# Patient Record
Sex: FEMALE | Race: WHITE | Employment: UNEMPLOYED | URBAN - NONMETROPOLITAN AREA
[De-identification: names, ages, dates, MRNs, and addresses within clinical notes are randomized per-mention and may not be internally consistent; named-entity substitution may affect disease eponyms.]

---

## 2017-12-22 ENCOUNTER — OFFICE VISIT (OUTPATIENT)
Dept: PRIMARY CARE CLINIC | Age: 32
End: 2017-12-22
Payer: COMMERCIAL

## 2017-12-22 VITALS
OXYGEN SATURATION: 98 % | BODY MASS INDEX: 36.94 KG/M2 | SYSTOLIC BLOOD PRESSURE: 126 MMHG | DIASTOLIC BLOOD PRESSURE: 74 MMHG | HEART RATE: 74 BPM | WEIGHT: 258 LBS | HEIGHT: 70 IN | TEMPERATURE: 98.8 F

## 2017-12-22 DIAGNOSIS — H65.02 ACUTE SEROUS OTITIS MEDIA OF LEFT EAR, RECURRENCE NOT SPECIFIED: Primary | ICD-10-CM

## 2017-12-22 DIAGNOSIS — J02.9 SORE THROAT: ICD-10-CM

## 2017-12-22 DIAGNOSIS — J01.00 ACUTE MAXILLARY SINUSITIS, RECURRENCE NOT SPECIFIED: ICD-10-CM

## 2017-12-22 LAB — S PYO AG THROAT QL: NORMAL

## 2017-12-22 PROCEDURE — 99213 OFFICE O/P EST LOW 20 MIN: CPT | Performed by: FAMILY MEDICINE

## 2017-12-22 PROCEDURE — 87880 STREP A ASSAY W/OPTIC: CPT | Performed by: FAMILY MEDICINE

## 2017-12-22 RX ORDER — AZITHROMYCIN 250 MG/1
TABLET, FILM COATED ORAL
Qty: 1 PACKET | Refills: 0 | Status: SHIPPED | OUTPATIENT
Start: 2017-12-22 | End: 2017-12-26

## 2017-12-23 NOTE — PROGRESS NOTES
lymphadenopathy. Chest is clear to auscultation, no wheezes, rales, or rhonchi. Heart sounds are regular rate and rhythm, no murmurs. Rapid Strep was negative. Assessment & Plan:    1. Acute serous otitis media of left ear, recurrence not specified  2. Acute maxillary sinusitis, recurrence not specified  - azithromycin (ZITHROMAX) 250 MG tablet; Take 2 po Day 1, then 1 po Daily Days 2-5. Take with food. Dispense: 1 packet; Refill: 0    She was advised to follow up if symptoms worsen or do not resolve.          (Please note that portions of this note were completed with a voice-recognition program. Efforts were made to edit the dictation but occasionally words are mis-transcribed.)

## 2018-10-25 ENCOUNTER — OFFICE VISIT (OUTPATIENT)
Dept: PRIMARY CARE CLINIC | Age: 33
End: 2018-10-25
Payer: COMMERCIAL

## 2018-10-25 VITALS
DIASTOLIC BLOOD PRESSURE: 60 MMHG | SYSTOLIC BLOOD PRESSURE: 110 MMHG | HEIGHT: 70 IN | BODY MASS INDEX: 39.22 KG/M2 | OXYGEN SATURATION: 97 % | WEIGHT: 274 LBS | TEMPERATURE: 97.4 F | HEART RATE: 80 BPM

## 2018-10-25 DIAGNOSIS — M79.10 MYALGIA: ICD-10-CM

## 2018-10-25 DIAGNOSIS — R05.9 COUGH: Primary | ICD-10-CM

## 2018-10-25 DIAGNOSIS — J01.41 ACUTE RECURRENT PANSINUSITIS: ICD-10-CM

## 2018-10-25 LAB
INFLUENZA A ANTIBODY: NEGATIVE
INFLUENZA B ANTIBODY: NEGATIVE

## 2018-10-25 PROCEDURE — 87804 INFLUENZA ASSAY W/OPTIC: CPT | Performed by: PHYSICIAN ASSISTANT

## 2018-10-25 PROCEDURE — 99213 OFFICE O/P EST LOW 20 MIN: CPT | Performed by: PHYSICIAN ASSISTANT

## 2018-10-25 RX ORDER — BLOOD-GLUCOSE METER
EACH MISCELLANEOUS
Refills: 0 | COMMUNITY
Start: 2018-10-22 | End: 2020-06-09 | Stop reason: ALTCHOICE

## 2018-10-25 RX ORDER — BLOOD SUGAR DIAGNOSTIC
STRIP MISCELLANEOUS
Refills: 0 | COMMUNITY
Start: 2018-10-22 | End: 2020-06-09 | Stop reason: ALTCHOICE

## 2018-10-25 RX ORDER — FLUTICASONE PROPIONATE 50 MCG
1 SPRAY, SUSPENSION (ML) NASAL
COMMUNITY
Start: 2017-12-22 | End: 2020-06-09

## 2018-10-25 RX ORDER — AZITHROMYCIN 250 MG/1
TABLET, FILM COATED ORAL
Qty: 1 PACKET | Refills: 1 | Status: SHIPPED | OUTPATIENT
Start: 2018-10-25 | End: 2018-10-29

## 2018-10-25 RX ORDER — PROMETHAZINE HYDROCHLORIDE 25 MG/1
1 TABLET ORAL PRN
Refills: 0 | COMMUNITY
Start: 2018-10-09 | End: 2020-06-09 | Stop reason: ALTCHOICE

## 2018-10-25 ASSESSMENT — ENCOUNTER SYMPTOMS
COUGH: 1
SINUS PRESSURE: 1
SORE THROAT: 0
TROUBLE SWALLOWING: 0
RHINORRHEA: 1
VOMITING: 0
SINUS PAIN: 1
SHORTNESS OF BREATH: 1
NAUSEA: 0
CHEST TIGHTNESS: 0
WHEEZING: 1
DIARRHEA: 0

## 2018-10-25 ASSESSMENT — PATIENT HEALTH QUESTIONNAIRE - PHQ9
1. LITTLE INTEREST OR PLEASURE IN DOING THINGS: 0
2. FEELING DOWN, DEPRESSED OR HOPELESS: 0
SUM OF ALL RESPONSES TO PHQ QUESTIONS 1-9: 0
SUM OF ALL RESPONSES TO PHQ QUESTIONS 1-9: 0
SUM OF ALL RESPONSES TO PHQ9 QUESTIONS 1 & 2: 0

## 2018-11-03 ENCOUNTER — TELEPHONE (OUTPATIENT)
Dept: PRIMARY CARE CLINIC | Age: 33
End: 2018-11-03

## 2018-11-03 DIAGNOSIS — J01.41 ACUTE RECURRENT PANSINUSITIS: Primary | ICD-10-CM

## 2018-11-04 RX ORDER — CEFPROZIL 500 MG/1
500 TABLET, FILM COATED ORAL 2 TIMES DAILY
Qty: 20 TABLET | Refills: 0 | Status: SHIPPED | OUTPATIENT
Start: 2018-11-04 | End: 2018-11-14

## 2019-05-24 ENCOUNTER — HOSPITAL ENCOUNTER (EMERGENCY)
Age: 34
Discharge: HOME | End: 2019-05-24
Attending: EMERGENCY MEDICINE
Payer: COMMERCIAL

## 2019-05-24 ENCOUNTER — APPOINTMENT (OUTPATIENT)
Dept: CT IMAGING | Age: 34
End: 2019-05-24
Payer: COMMERCIAL

## 2019-05-24 VITALS
SYSTOLIC BLOOD PRESSURE: 148 MMHG | WEIGHT: 235 LBS | TEMPERATURE: 97.7 F | DIASTOLIC BLOOD PRESSURE: 71 MMHG | OXYGEN SATURATION: 99 % | RESPIRATION RATE: 16 BRPM | BODY MASS INDEX: 33.72 KG/M2 | HEART RATE: 86 BPM

## 2019-05-24 DIAGNOSIS — R51.9 ACUTE NONINTRACTABLE HEADACHE, UNSPECIFIED HEADACHE TYPE: Primary | ICD-10-CM

## 2019-05-24 DIAGNOSIS — G43.109 MIGRAINE WITH AURA AND WITHOUT STATUS MIGRAINOSUS, NOT INTRACTABLE: ICD-10-CM

## 2019-05-24 LAB
ANION GAP SERPL CALCULATED.3IONS-SCNC: 12 MEQ/L (ref 8–16)
BASOPHILS # BLD: 0.5 %
BASOPHILS ABSOLUTE: 0 THOU/MM3 (ref 0–0.1)
BUN BLDV-MCNC: 12 MG/DL (ref 7–22)
CALCIUM SERPL-MCNC: 9.6 MG/DL (ref 8.5–10.5)
CHLORIDE BLD-SCNC: 102 MEQ/L (ref 98–111)
CO2: 28 MEQ/L (ref 23–33)
CREAT SERPL-MCNC: 0.7 MG/DL (ref 0.4–1.2)
EOSINOPHIL # BLD: 2 %
EOSINOPHILS ABSOLUTE: 0.2 THOU/MM3 (ref 0–0.4)
ERYTHROCYTE [DISTWIDTH] IN BLOOD BY AUTOMATED COUNT: 13.2 % (ref 11.5–14.5)
ERYTHROCYTE [DISTWIDTH] IN BLOOD BY AUTOMATED COUNT: 40.6 FL (ref 35–45)
GFR SERPL CREATININE-BSD FRML MDRD: > 90 ML/MIN/1.73M2
GLUCOSE BLD-MCNC: 100 MG/DL (ref 70–108)
HCT VFR BLD CALC: 39 % (ref 37–47)
HEMOGLOBIN: 12.2 GM/DL (ref 12–16)
IMMATURE GRANS (ABS): 0.02 THOU/MM3 (ref 0–0.07)
IMMATURE GRANULOCYTES: 0.2 %
LYMPHOCYTES # BLD: 28.7 %
LYMPHOCYTES ABSOLUTE: 2.5 THOU/MM3 (ref 1–4.8)
MCH RBC QN AUTO: 26.6 PG (ref 26–33)
MCHC RBC AUTO-ENTMCNC: 31.3 GM/DL (ref 32.2–35.5)
MCV RBC AUTO: 85 FL (ref 81–99)
MONOCYTES # BLD: 4.9 %
MONOCYTES ABSOLUTE: 0.4 THOU/MM3 (ref 0.4–1.3)
NUCLEATED RED BLOOD CELLS: 0 /100 WBC
OSMOLALITY CALCULATION: 283 MOSMOL/KG (ref 275–300)
PLATELET # BLD: 264 THOU/MM3 (ref 130–400)
PMV BLD AUTO: 11.4 FL (ref 9.4–12.4)
POTASSIUM SERPL-SCNC: 3.9 MEQ/L (ref 3.5–5.2)
PREGNANCY, SERUM: NEGATIVE
RBC # BLD: 4.59 MILL/MM3 (ref 4.2–5.4)
SEG NEUTROPHILS: 63.7 %
SEGMENTED NEUTROPHILS ABSOLUTE COUNT: 5.6 THOU/MM3 (ref 1.8–7.7)
SODIUM BLD-SCNC: 142 MEQ/L (ref 135–145)
WBC # BLD: 8.8 THOU/MM3 (ref 4.8–10.8)

## 2019-05-24 PROCEDURE — 36415 COLL VENOUS BLD VENIPUNCTURE: CPT

## 2019-05-24 PROCEDURE — 96375 TX/PRO/DX INJ NEW DRUG ADDON: CPT

## 2019-05-24 PROCEDURE — 6360000002 HC RX W HCPCS: Performed by: EMERGENCY MEDICINE

## 2019-05-24 PROCEDURE — 70450 CT HEAD/BRAIN W/O DYE: CPT

## 2019-05-24 PROCEDURE — 84703 CHORIONIC GONADOTROPIN ASSAY: CPT

## 2019-05-24 PROCEDURE — 85025 COMPLETE CBC W/AUTO DIFF WBC: CPT

## 2019-05-24 PROCEDURE — 96374 THER/PROPH/DIAG INJ IV PUSH: CPT

## 2019-05-24 PROCEDURE — 80048 BASIC METABOLIC PNL TOTAL CA: CPT

## 2019-05-24 PROCEDURE — 99284 EMERGENCY DEPT VISIT MOD MDM: CPT

## 2019-05-24 PROCEDURE — 2580000003 HC RX 258: Performed by: EMERGENCY MEDICINE

## 2019-05-24 RX ORDER — SUMATRIPTAN 6 MG/.5ML
6 INJECTION, SOLUTION SUBCUTANEOUS ONCE
Status: COMPLETED | OUTPATIENT
Start: 2019-05-24 | End: 2019-05-24

## 2019-05-24 RX ORDER — SODIUM CHLORIDE 9 MG/ML
INJECTION, SOLUTION INTRAVENOUS CONTINUOUS
Status: DISCONTINUED | OUTPATIENT
Start: 2019-05-24 | End: 2019-05-25 | Stop reason: HOSPADM

## 2019-05-24 RX ORDER — KETOROLAC TROMETHAMINE 30 MG/ML
30 INJECTION, SOLUTION INTRAMUSCULAR; INTRAVENOUS ONCE
Status: COMPLETED | OUTPATIENT
Start: 2019-05-24 | End: 2019-05-24

## 2019-05-24 RX ORDER — SUMATRIPTAN 50 MG/1
50 TABLET, FILM COATED ORAL
Qty: 4 TABLET | Refills: 0 | Status: SHIPPED | OUTPATIENT
Start: 2019-05-24 | End: 2020-06-09 | Stop reason: ALTCHOICE

## 2019-05-24 RX ORDER — 0.9 % SODIUM CHLORIDE 0.9 %
500 INTRAVENOUS SOLUTION INTRAVENOUS ONCE
Status: COMPLETED | OUTPATIENT
Start: 2019-05-24 | End: 2019-05-24

## 2019-05-24 RX ORDER — ONDANSETRON 2 MG/ML
4 INJECTION INTRAMUSCULAR; INTRAVENOUS ONCE
Status: COMPLETED | OUTPATIENT
Start: 2019-05-24 | End: 2019-05-24

## 2019-05-24 RX ORDER — ONDANSETRON 4 MG/1
4 TABLET, ORALLY DISINTEGRATING ORAL 3 TIMES DAILY PRN
Qty: 8 TABLET | Refills: 0 | Status: SHIPPED | OUTPATIENT
Start: 2019-05-24 | End: 2020-06-09 | Stop reason: ALTCHOICE

## 2019-05-24 RX ADMIN — SUMATRIPTAN 6 MG: 6 INJECTION, SOLUTION SUBCUTANEOUS at 20:44

## 2019-05-24 RX ADMIN — KETOROLAC TROMETHAMINE 30 MG: 30 INJECTION, SOLUTION INTRAMUSCULAR at 21:28

## 2019-05-24 RX ADMIN — ONDANSETRON 4 MG: 2 INJECTION INTRAMUSCULAR; INTRAVENOUS at 21:24

## 2019-05-24 RX ADMIN — SODIUM CHLORIDE 500 ML: 9 INJECTION, SOLUTION INTRAVENOUS at 21:24

## 2019-05-24 ASSESSMENT — PAIN DESCRIPTION - PAIN TYPE: TYPE: ACUTE PAIN

## 2019-05-24 ASSESSMENT — ENCOUNTER SYMPTOMS
ABDOMINAL PAIN: 0
BACK PAIN: 0
VOMITING: 0
COUGH: 0
NAUSEA: 1
SORE THROAT: 0
EYE DISCHARGE: 0
RHINORRHEA: 0
SHORTNESS OF BREATH: 0
DIARRHEA: 0
PHOTOPHOBIA: 1
WHEEZING: 0
EYE PAIN: 0

## 2019-05-24 ASSESSMENT — PAIN DESCRIPTION - LOCATION: LOCATION: HEAD

## 2019-05-24 ASSESSMENT — PAIN SCALES - GENERAL: PAINLEVEL_OUTOF10: 7

## 2019-05-25 NOTE — ED TRIAGE NOTES
Patient presents to the ED ambulatory with complaints of a headache that started an hour ago. Patient unsure of pregnancy. Patient stating that she has some vision changes and that the pain is mostly over her right eye. No LOC. Patient is nauseated but denies any vomiting.

## 2019-05-25 NOTE — ED PROVIDER NOTES
[address] [address]   eMERGENCY dEPARTMENT eNCOUnter        CHIEF COMPLAINT    Chief Complaint   Patient presents with    Headache       Nurses Notes reviewed and I agree except as noted in the HPI. HPI    Jeff Ribeiro is a 30 y.o. female who presents for evaluation of a right frontal headache that started 1 hour ago. She describes it as sharp and currently rates her pain as a 9/10 in severity. Patient is also complaining of nausea, photophobia, left hand numbness, vision changes saying that there is a \"black dot\", and that her vision is blurred. She has taken tylenol with no relief. Patient says that when she was in highschool she had chronic migraines and when she was pregnant she did as well. She is currently 5 months post partum. Patient denies further questions or complaints at this time. REVIEW OF SYSTEMS    Review of Systems   Constitutional: Negative for appetite change, chills, fatigue and fever. HENT: Negative for congestion, ear pain, rhinorrhea and sore throat. Eyes: Positive for photophobia and visual disturbance (\"black dot\", blur). Negative for pain and discharge. Respiratory: Negative for cough, shortness of breath and wheezing. Cardiovascular: Negative for chest pain, palpitations and leg swelling. Gastrointestinal: Positive for nausea. Negative for abdominal pain, diarrhea and vomiting. Genitourinary: Negative for difficulty urinating, dysuria, hematuria and vaginal discharge. Musculoskeletal: Negative for arthralgias, back pain, joint swelling and neck pain. Skin: Negative for pallor and rash. Neurological: Positive for numbness (left hand) and headaches (right frontal). Negative for dizziness, syncope, weakness and light-headedness. Hematological: Negative for adenopathy. Psychiatric/Behavioral: Negative for confusion and suicidal ideas. The patient is not nervous/anxious.         PAST MEDICAL HISTORY     has a past medical history of Asthma, GERD (gastroesophageal reflux disease), and Molar pregnancy. SURGICAL HISTORY     has a past surgical history that includes Cholecystectomy; [place] tooth extraction; Tonsillectomy; and Adenoidectomy. CURRENT MEDICATIONS    Discharge Medication List as of 5/24/2019 10:00 PM      CONTINUE these medications which have NOT CHANGED    Details   promethazine (PHENERGAN) 25 MG tablet 1 tablet as needed, R-0Historical Med      ONETOUCH VERIO strip TEST four times a day, R-0, DAWHistorical Med      Blood Glucose Monitoring Suppl (ONETOUCH VERIO) w/Device KIT use four times a day as directed, R-0Historical Med      DOCOSAHEXAENOIC ACID PO Take 1 capsule by mouthHistorical Med      fluticasone (FLONASE) 50 MCG/ACT nasal spray 1 spray by Nasal routeHistorical Med      albuterol sulfate  (90 BASE) MCG/ACT inhaler Inhale 2 puffs into the lungs every 6 hours as needed for Wheezing or Shortness of Breath, Disp-1 Inhaler, R-0             ALLERGIES    is allergic to sulfa antibiotics and pcn [penicillins]. FAMILY HISTORY    indicated that the status of her mother is unknown. She indicated that the status of her father is unknown. She indicated that the status of her sister is unknown. She indicated that the status of her maternal grandfather is unknown. She indicated that the status of her paternal grandmother is unknown. She indicated that the status of her paternal grandfather is unknown.   family history includes Asthma in her sister; Cancer in her paternal grandfather; Diabetes in her paternal grandmother; Heart Disease in her maternal grandfather and mother; High Cholesterol in her father. SOCIAL HISTORY     reports that she has never smoked. She has never used smokeless tobacco. She reports that she drinks alcohol. She reports that she does not use drugs.     PHYSICAL EXAM      INITIAL VITALS: BP (!) 148/71   Pulse 86   Temp 97.7 °F (36.5 °C) (Oral)   Resp 16   Wt 235 lb (106.6 and MRI have been read by the radiologist.  [address] [place] [address]   Final Result   1. Negative exam for acute appearing pathology. Stable appearing calcifications compared to prior study. **This report has been created using voice recognition software. It may contain minor errors which are inherent in voice recognition technology. **      Final report electronically signed by Dr. Michelle Lorenzo on 5/24/2019 9:55 PM            Vitals:    Vitals:    05/24/19 2019   BP: (!) 148/71   Pulse: 86   Resp: 16   Temp: 97.7 °F (36.5 °C)   TempSrc: Oral   SpO2: 99%   Weight: 235 lb (106.6 kg)       EMERGENCY DEPARTMENT COURSE:    Medications   SUMAtriptan (IMITREX) injection 6 mg (6 mg Subcutaneous Given 5/24/19 2044)   0.9 % sodium chloride bolus (0 mLs Intravenous Stopped 5/24/19 2204)   ondansetron (ZOFRAN) injection 4 mg (4 mg Intravenous Given 5/24/19 2124)   ketorolac (TORADOL) injection 30 mg (30 mg Intravenous Given 5/24/19 2128)       The pt was seen and evaluated by me. Within the department, I observed the pt's vitalsigns to be within acceptable range. Laboratory and Radiological studies were performed, results were reviewed with the patient. Within the department, the pt was treated with Imitrex, without help then IV fluid hydration, Zofran, Toradol. I observed the pt's condition to be hemodynamically stable and the patient's migraine improve during the duration of their stay. I explained my proposed course of treatment to the pt, and they were amenable to my decision. They were discharged home, and they will return to the ED if their symptoms become more severein nature, or otherwise change. CRITICAL CARE:   None. CONSULTS:  None    PROCEDURES:  None. FINAL IMPRESSION       1. Acute nonintractable headache, unspecified headache type    2.  Migraine with aura and without status migrainosus, not intractable          DISPOSITION/PLAN  PATIENT REFERRED TO:  Kathleen Ayala MD  [address] [place] [address] [place]  [phone]    Schedule an appointment as soon as possible for a visit in 4 days      [facility] [address] [address] EMERGENCY DEPT  [address] [address]  [phone]    As needed, If symptoms worsen    DISCHARGEMEDICATIONS:  Discharge Medication List as of 5/24/2019 10:00 PM      START taking these medications    Details   SUMAtriptan (IMITREX) 50 MG tablet Take 1 tablet by mouth once as needed for Migraine, Disp-4 tablet, R-0Print      ondansetron (ZOFRAN-ODT) 4 MG disintegrating tablet Take 1 tablet by mouth 3 times daily as needed for Nausea or Vomiting, Disp-8 tablet, R-0Print             (Please note that portions of this note were completed with a voice recognition program.  Efforts were [facility] edit the dictations but occasionally words are mis-transcribed.)      Scribe:  Lawrence Hoffmann  06/03/19 8:33 PM Scribing for and in the presence of Onelia Roth M.D. Signed by: Lawrence Hoffmann (Name), Arvind, 06/03/19 11:52 AM    Provider:  I personally performed the services described in the documentation, reviewed and edited the documentation which was dictated to the scribe in mypresence, and it accurately records my words and actions.      06/03/19 11:52 AM      Estefany Fuller MD      Emergency room physician             Estefany Fuller MD  06/03/19 7182

## 2020-06-09 ENCOUNTER — OUTSIDE SERVICES (OUTPATIENT)
Dept: INTERNAL MEDICINE | Age: 35
End: 2020-06-09
Payer: COMMERCIAL

## 2020-06-09 RX ORDER — FLUTICASONE PROPIONATE 50 MCG
1 SPRAY, SUSPENSION (ML) NASAL DAILY
COMMUNITY

## 2020-06-09 RX ORDER — PROPRANOLOL HCL 60 MG
60 CAPSULE, EXTENDED RELEASE 24HR ORAL DAILY
COMMUNITY

## 2020-06-09 RX ORDER — ALBUTEROL SULFATE 90 UG/1
2 AEROSOL, METERED RESPIRATORY (INHALATION) EVERY 6 HOURS PRN
COMMUNITY

## 2020-06-09 RX ORDER — BUTALBITAL, ACETAMINOPHEN AND CAFFEINE 50; 325; 40 MG/1; MG/1; MG/1
1 TABLET ORAL EVERY 6 HOURS PRN
Qty: 180 TABLET | Refills: 3 | Status: CANCELLED
Start: 2020-06-09

## 2020-06-09 RX ORDER — PROPRANOLOL HCL 60 MG
60 CAPSULE, EXTENDED RELEASE 24HR ORAL DAILY
Qty: 60 CAPSULE | Refills: 0 | Status: CANCELLED | OUTPATIENT
Start: 2020-06-09

## 2020-06-09 RX ORDER — BUTALBITAL, ACETAMINOPHEN AND CAFFEINE 50; 325; 40 MG/1; MG/1; MG/1
1 TABLET ORAL EVERY 6 HOURS PRN
COMMUNITY

## 2020-06-09 RX ORDER — TRIAMCINOLONE ACETONIDE 0.25 MG/G
OINTMENT TOPICAL 2 TIMES DAILY
Qty: 15 G | Refills: 0 | Status: SHIPPED | OUTPATIENT
Start: 2020-06-09 | End: 2020-06-11 | Stop reason: ALTCHOICE

## 2020-06-09 RX ORDER — TRIAMCINOLONE ACETONIDE 1 MG/ML
LOTION TOPICAL 2 TIMES DAILY
Qty: 60 ML | Status: CANCELLED | OUTPATIENT
Start: 2020-06-09

## 2020-06-09 ASSESSMENT — ENCOUNTER SYMPTOMS
DIARRHEA: 0
SHORTNESS OF BREATH: 0
RHINORRHEA: 0
NAUSEA: 0
COUGH: 0
WHEEZING: 0
VOMITING: 0

## 2020-06-09 NOTE — PROGRESS NOTES
Negative for congestion and rhinorrhea. Respiratory: Negative for cough, shortness of breath and wheezing. Gastrointestinal: Negative for diarrhea, nausea and vomiting. Skin: Positive for rash. Neurological: Negative for dizziness and weakness. Objective: There were no vitals filed for this visit. Physical Exam  Constitutional:       General: She is not in acute distress. HENT:      Head: Normocephalic and atraumatic. Right Ear: External ear normal.      Left Ear: External ear normal.   Eyes:      Extraocular Movements: Extraocular movements intact. Conjunctiva/sclera: Conjunctivae normal.   Pulmonary:      Effort: Pulmonary effort is normal. No respiratory distress. Skin:     General: Skin is warm and dry. Neurological:      General: No focal deficit present. Mental Status: She is alert and oriented to person, place, and time. Psychiatric:         Mood and Affect: Mood normal.         Behavior: Behavior normal.         Assessment/Plan:        1. Dermatitis  - Will start triamcinolone. Supportive care discussed        Return if symptoms worsen or fail to improve. No orders of the defined types were placed in this encounter. Orders Placed This Encounter   Medications    triamcinolone (KENALOG) 0.025 % ointment     Sig: Apply topically 2 times daily To affected area for up to 2 weeks     Dispense:  15 g     Refill:  0       All patient questions answered. Pt voiced understanding.              Electronically signed by SUDEEP Alston on 6/9/2020 at 2:31 PM

## 2020-06-11 ENCOUNTER — TELEPHONE (OUTPATIENT)
Dept: INTERNAL MEDICINE | Age: 35
End: 2020-06-11

## 2020-06-11 RX ORDER — PREDNISONE 20 MG/1
20 TABLET ORAL DAILY
Qty: 3 TABLET | Refills: 0 | Status: SHIPPED | OUTPATIENT
Start: 2020-06-11 | End: 2020-06-14

## 2021-04-27 ENCOUNTER — OUTSIDE SERVICES (OUTPATIENT)
Dept: INTERNAL MEDICINE | Age: 36
End: 2021-04-27
Payer: COMMERCIAL

## 2021-04-27 VITALS — SYSTOLIC BLOOD PRESSURE: 112 MMHG | DIASTOLIC BLOOD PRESSURE: 68 MMHG | RESPIRATION RATE: 16 BRPM

## 2021-04-27 DIAGNOSIS — J06.9 VIRAL URI WITH COUGH: Primary | ICD-10-CM

## 2021-04-27 ASSESSMENT — ENCOUNTER SYMPTOMS
SHORTNESS OF BREATH: 0
NAUSEA: 0
DIARRHEA: 0
COUGH: 1
VOMITING: 0
SORE THROAT: 1

## 2021-04-27 NOTE — PROGRESS NOTES
Colby [address] is a 31 y.o. female employee of [employer] who presents today for medical conditions/complaints as noted below. HPI:     HPI    Patient presents for evaluation and management of cough. Symptoms started over the weekend, initially had some pharyngitis, later developed cough and congestion. Afebrile. She works at [employer] and rapid COVID swab was negative this morning. Denies dyspnea. She does have a history of asthma, and notes these symptoms have been worsening recently. Does occasionally have some wheezing, which is relieved by albuterol. Has been using this more frequently over the past few days. Has also tried mucinex with minimal improvement, occasionally has also had some sudafed for cough. Denies nausea, vomiting, diarrhea, fever, or chills. Current Outpatient Medications   Medication Sig Dispense Refill    propranolol (INDERAL LA) 60 MG extended release capsule Take 60 mg by mouth daily      butalbital-acetaminophen-caffeine (FIORICET, ESGIC) -40 MG per tablet Take 1 tablet by mouth every 6 hours as needed for Headaches      albuterol sulfate HFA (VENTOLIN HFA) 108 (90 Base) MCG/ACT inhaler Inhale 2 puffs into the lungs every 6 hours as needed for Wheezing or Shortness of Breath      fluticasone (FLONASE) 50 MCG/ACT nasal spray 1 spray by Each Nostril route daily       No current facility-administered medications for this visit.       Allergies   Allergen Reactions    Sulfa Antibiotics Hives    Pcn [Penicillins] Rash     Some difficult breathing       Health Maintenance   Topic Date Due    Hepatitis C screen  Never done    Varicella vaccine (1 of 2 - 2-dose childhood series) Never done    Pneumococcal 0-64 years Vaccine (1 of 1 - PPSV23) Never done    HIV screen  Never done    COVID-19 Vaccine (1) Never done    DTaP/Tdap/Td vaccine (1 - Tdap) Never done    Cervical cancer screen  Never done    Flu vaccine (Season Ended) 09/01/2021    Hepatitis A vaccine  Aged Out    Hepatitis B vaccine  Aged Out    Hib vaccine  Aged Out    Meningococcal (ACWY) vaccine  Aged Out       Subjective:      Review of Systems   Constitutional: Negative for chills and fever. HENT: Positive for congestion and sore throat. Respiratory: Positive for cough. Negative for shortness of breath. Cardiovascular: Negative for chest pain. Gastrointestinal: Negative for diarrhea, nausea and vomiting. Musculoskeletal: Negative for myalgias. Objective:     Vitals:    04/27/21 0907   BP: 112/68   Resp: 16     Physical Exam  Constitutional:       General: She is not in acute distress. HENT:      Head: Normocephalic and atraumatic. Mouth/Throat:      Pharynx: No oropharyngeal exudate. Comments: Mild erythema  Eyes:      Extraocular Movements: Extraocular movements intact. Conjunctiva/sclera: Conjunctivae normal.   Cardiovascular:      Rate and Rhythm: Normal rate and regular rhythm. Pulmonary:      Effort: Pulmonary effort is normal. No respiratory distress. Breath sounds: Normal breath sounds. Lymphadenopathy:      Cervical: No cervical adenopathy. Skin:     General: Skin is warm and dry. Neurological:      Mental Status: She is alert. Assessment/Plan:        1. Viral URI with cough  - Discussed supportive care at this time. Advised to use albuterol regularly Q6H for several days. No signs of acute asthma exacerbation at this time, but did discuss treatment with prednisone if wheezing does not improve. Return if symptoms worsen or fail to improve. No orders of the defined types were placed in this encounter. No orders of the defined types were placed in this encounter.                 Electronically signed by SUDEEP Canales on 4/27/2021 at 10:59 AM

## 2021-11-16 ENCOUNTER — OUTSIDE SERVICES (OUTPATIENT)
Dept: INTERNAL MEDICINE | Age: 36
End: 2021-11-16
Payer: COMMERCIAL

## 2021-11-16 DIAGNOSIS — J30.2 SEASONAL ALLERGIES: ICD-10-CM

## 2021-11-16 DIAGNOSIS — J32.9 RECURRENT SINUSITIS: Primary | ICD-10-CM

## 2021-11-16 RX ORDER — AZITHROMYCIN 250 MG/1
TABLET, FILM COATED ORAL
Qty: 6 TABLET | Refills: 0 | Status: SHIPPED | OUTPATIENT
Start: 2021-11-16

## 2021-11-16 ASSESSMENT — ENCOUNTER SYMPTOMS
SHORTNESS OF BREATH: 0
SINUS PRESSURE: 1
SINUS PAIN: 1
VOMITING: 0
DIARRHEA: 0
NAUSEA: 0
COUGH: 0
WHEEZING: 0

## 2021-11-16 NOTE — PROGRESS NOTES
[facility] Internal Medicine  [address] [place], [address] [place]  [phone]        HPI:     HPI     Patient presents for evaluation and management of sinus pressure. She notes she has had maxillary sinus pressure for over 1. Has also noted some lymphadenopathy. Afebrile, she does note she has had some chills. She has been negative Covid test.  Does have a history of asthma and seasonal allergies. She has tried Sudafed, Zyrtec, Allegra-D, and Flonase to treat, with minimal improvement. Current Outpatient Medications   Medication Sig Dispense Refill    azithromycin (ZITHROMAX) 250 MG tablet 500 mg on day 1 followed by 250 mg on days 2 - 5 6 tablet 0    propranolol (INDERAL LA) 60 MG extended release capsule Take 60 mg by mouth daily      butalbital-acetaminophen-caffeine (FIORICET, ESGIC) -40 MG per tablet Take 1 tablet by mouth every 6 hours as needed for Headaches      albuterol sulfate HFA (VENTOLIN HFA) 108 (90 Base) MCG/ACT inhaler Inhale 2 puffs into the lungs every 6 hours as needed for Wheezing or Shortness of Breath      fluticasone (FLONASE) 50 MCG/ACT nasal spray 1 spray by Each Nostril route daily       No current facility-administered medications for this visit.      Allergies   Allergen Reactions    Sulfa Antibiotics Hives    Pcn [Penicillins] Rash     Some difficult breathing       Health Maintenance   Topic Date Due    Hepatitis C screen  Never done    Varicella vaccine (1 of 2 - 2-dose childhood series) Never done    Pneumococcal 0-64 years Vaccine (1 of 2 - PPSV23) Never done    COVID-19 Vaccine (1) Never done    HIV screen  Never done    DTaP/Tdap/Td vaccine (1 - Tdap) Never done    Cervical cancer screen  Never done    Flu vaccine (1) Never done    Hepatitis A vaccine  Aged Out    Hepatitis B vaccine  Aged Out    Hib vaccine  Aged Out    Meningococcal (ACWY) vaccine  Aged Out       Subjective:      Review of Systems   Constitutional: Negative for chills and fever. HENT: Positive for congestion, sinus pressure and sinus pain. Respiratory: Negative for cough, shortness of breath and wheezing. Cardiovascular: Negative for chest pain and leg swelling. Gastrointestinal: Negative for diarrhea, nausea and vomiting. Neurological: Negative for dizziness and weakness. Objective: There were no vitals filed for this visit. Physical Exam  Constitutional:       General: She is not in acute distress. HENT:      Head: Normocephalic and atraumatic. Right Ear: External ear normal.      Left Ear: External ear normal.      Nose:      Right Sinus: Maxillary sinus tenderness present. Left Sinus: Maxillary sinus tenderness present. Eyes:      Extraocular Movements: Extraocular movements intact. Conjunctiva/sclera: Conjunctivae normal.   Cardiovascular:      Rate and Rhythm: Normal rate and regular rhythm. Pulmonary:      Effort: Pulmonary effort is normal. No respiratory distress. Breath sounds: Normal breath sounds. Neurological:      General: No focal deficit present. Mental Status: She is alert. Mental status is at baseline. Psychiatric:         Mood and Affect: Mood normal.         Behavior: Behavior normal.         Assessment/Plan:        1. Recurrent sinusitis  2. Seasonal allergies  - Start azithromycin. Advised she may continue allegra D and flonase. Will refer to ENT given she has had recurrent sinusitis. - azithromycin (ZITHROMAX) 250 MG tablet; 500 mg on day 1 followed by 250 mg on days 2 - 5  Dispense: 6 tablet; Refill: Courtney Kramer MD, Otolaryngology, [place]        Return if symptoms worsen or fail to improve.     Orders Placed This Encounter   Procedures   Suzie Christine MD, Otolaryngology, [place]     Referral Priority:   Routine     Referral Type:   Eval and Treat     Referral Reason:   Specialty Services Required     Referred to Provider:   Chen Guzman MD     Requested Specialty:   Otolaryngology     Number of Visits Requested:   1     Orders Placed This Encounter   Medications    azithromycin (ZITHROMAX) 250 MG tablet     Si mg on day 1 followed by 250 mg on days 2 - 5     Dispense:  6 tablet     Refill:  0       All patient questions answered. Pt voiced understanding.            Electronically signed by Lazarus Britain, APRN - CNP on 2021 at 4:03 PM

## 2022-09-13 ENCOUNTER — OUTSIDE SERVICES (OUTPATIENT)
Dept: INTERNAL MEDICINE | Age: 37
End: 2022-09-13
Payer: COMMERCIAL

## 2022-09-13 DIAGNOSIS — U07.1 COVID-19: Primary | ICD-10-CM

## 2022-09-13 DIAGNOSIS — J45.20 MILD INTERMITTENT ASTHMA WITHOUT COMPLICATION: ICD-10-CM

## 2022-09-13 RX ORDER — PREDNISONE 20 MG/1
20 TABLET ORAL DAILY
Qty: 5 TABLET | Refills: 0 | Status: SHIPPED | OUTPATIENT
Start: 2022-09-13 | End: 2022-09-18

## 2022-09-13 ASSESSMENT — ENCOUNTER SYMPTOMS
DIARRHEA: 0
SHORTNESS OF BREATH: 0
RHINORRHEA: 0
NAUSEA: 0
COUGH: 1
WHEEZING: 1
VOMITING: 0

## 2022-09-13 NOTE — PROGRESS NOTES
[facility] Internal Medicine  [address] [place], [address] [place]  [phone]      Florene Hodgkin c/o of No chief complaint on file. 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 She recently tested positive for COVID. Symptoms started last Wednesday. She does have a history of mild asthma that is generally well controlled. She has not had any dyspnea, but has some mild wheezing. Mild cough. Afebrile. She does not presently have a pulse oximeter available, but has not had any concern for desaturation. Current Outpatient Medications   Medication Sig Dispense Refill    predniSONE (DELTASONE) 20 MG tablet Take 1 tablet by mouth daily for 5 days 5 tablet 0    azithromycin (ZITHROMAX) 250 MG tablet 500 mg on day 1 followed by 250 mg on days 2 - 5 6 tablet 0    propranolol (INDERAL LA) 60 MG extended release capsule Take 60 mg by mouth daily      butalbital-acetaminophen-caffeine (FIORICET, ESGIC) -40 MG per tablet Take 1 tablet by mouth every 6 hours as needed for Headaches      albuterol sulfate HFA (VENTOLIN HFA) 108 (90 Base) MCG/ACT inhaler Inhale 2 puffs into the lungs every 6 hours as needed for Wheezing or Shortness of Breath      fluticasone (FLONASE) 50 MCG/ACT nasal spray 1 spray by Each Nostril route daily       No current facility-administered medications for this visit.      Allergies   Allergen Reactions    Sulfa Antibiotics Hives    Pcn [Penicillins] Rash     Some difficult breathing       Health Maintenance   Topic Date Due    COVID-19 Vaccine (1) Never done    Varicella vaccine (1 of 2 - 2-dose childhood series) Never done    Depression Screen  Never done    HIV screen  Never done    Hepatitis C screen  Never done    DTaP/Tdap/Td vaccine (1 - Tdap) Never done    Cervical cancer screen  Never done    Flu vaccine (1) Never done    Hepatitis A vaccine  Aged Out    Hepatitis B vaccine  Aged Out    Hib vaccine  Aged Out    Meningococcal (ACWY) vaccine  Aged Out    Pneumococcal 0-64 years Vaccine  Aged Out       Subjective:      Review of Systems   Constitutional:  Negative for chills and fever. HENT:  Negative for congestion and rhinorrhea. Respiratory:  Positive for cough and wheezing. Negative for shortness of breath. Cardiovascular:  Negative for chest pain and leg swelling. Gastrointestinal:  Negative for diarrhea, nausea and vomiting. Neurological:  Negative for dizziness and weakness. Objective: There were no vitals filed for this visit. Physical Exam  Constitutional:       General: She is not in acute distress. HENT:      Head: Normocephalic and atraumatic. Right Ear: External ear normal.      Left Ear: External ear normal.   Eyes:      Extraocular Movements: Extraocular movements intact. Conjunctiva/sclera: Conjunctivae normal.   Cardiovascular:      Rate and Rhythm: Normal rate and regular rhythm. Pulmonary:      Effort: Pulmonary effort is normal. No respiratory distress. Breath sounds: Normal breath sounds. Neurological:      General: No focal deficit present. Mental Status: She is alert. Mental status is at baseline. Psychiatric:         Mood and Affect: Mood normal.         Behavior: Behavior normal.       Assessment/Plan:        1. COVID-19  2. Mild intermittent asthma without complication  - Possible asthma exacerbation related to COVID, will start prednisone as noted below. Return if symptoms worsen or fail to improve. No orders of the defined types were placed in this encounter. Orders Placed This Encounter   Medications    predniSONE (DELTASONE) 20 MG tablet     Sig: Take 1 tablet by mouth daily for 5 days     Dispense:  5 tablet     Refill:  0       All patient questions answered. Pt voiced understanding.            Electronically signed by LARISA Mccarty CNP on 9/13/2022 at 3:39 PM

## 2022-12-06 ENCOUNTER — OUTSIDE SERVICES (OUTPATIENT)
Dept: INTERNAL MEDICINE | Age: 37
End: 2022-12-06

## 2023-04-01 DIAGNOSIS — J32.9 RECURRENT SINUSITIS: ICD-10-CM

## 2023-04-02 ENCOUNTER — HOSPITAL ENCOUNTER (EMERGENCY)
Age: 38
Discharge: HOME | End: 2023-04-02
Attending: EMERGENCY MEDICINE
Payer: COMMERCIAL

## 2023-04-02 ENCOUNTER — APPOINTMENT (OUTPATIENT)
Dept: CT IMAGING | Age: 38
End: 2023-04-02
Payer: COMMERCIAL

## 2023-04-02 VITALS
WEIGHT: 240 LBS | BODY MASS INDEX: 35.55 KG/M2 | RESPIRATION RATE: 12 BRPM | HEART RATE: 77 BPM | OXYGEN SATURATION: 99 % | HEIGHT: 69 IN | SYSTOLIC BLOOD PRESSURE: 105 MMHG | DIASTOLIC BLOOD PRESSURE: 83 MMHG | TEMPERATURE: 98 F

## 2023-04-02 DIAGNOSIS — G43.109 OPHTHALMIC MIGRAINE: Primary | ICD-10-CM

## 2023-04-02 LAB
ABSOLUTE EOS #: 0.16 K/UL (ref 0–0.44)
ABSOLUTE IMMATURE GRANULOCYTE: <0.03 K/UL (ref 0–0.3)
ABSOLUTE LYMPH #: 1.97 K/UL (ref 1.1–3.7)
ABSOLUTE MONO #: 0.34 K/UL (ref 0.1–1.2)
ANION GAP SERPL CALCULATED.3IONS-SCNC: 11 MMOL/L (ref 9–17)
BASOPHILS # BLD: 1 % (ref 0–2)
BASOPHILS ABSOLUTE: 0.04 K/UL (ref 0–0.2)
BUN SERPL-MCNC: 9 MG/DL (ref 6–20)
BUN/CREAT BLD: 12 (ref 9–20)
CALCIUM SERPL-MCNC: 9.3 MG/DL (ref 8.6–10.4)
CHLORIDE SERPL-SCNC: 104 MMOL/L (ref 98–107)
CO2 SERPL-SCNC: 27 MMOL/L (ref 20–31)
CREAT SERPL-MCNC: 0.74 MG/DL (ref 0.5–0.9)
EOSINOPHILS RELATIVE PERCENT: 2 % (ref 1–4)
ERYTHROCYTE [SEDIMENTATION RATE] IN BLOOD: 6 MM/HR (ref 0–20)
GFR SERPL CREATININE-BSD FRML MDRD: >60 ML/MIN/1.73M2
GLUCOSE SERPL-MCNC: 96 MG/DL (ref 70–99)
HCG QUALITATIVE: NEGATIVE
HCT VFR BLD AUTO: 41 % (ref 36.3–47.1)
HGB BLD-MCNC: 13.3 G/DL (ref 11.9–15.1)
IMMATURE GRANULOCYTES: 0 %
LYMPHOCYTES # BLD: 30 % (ref 24–43)
MCH RBC QN AUTO: 28.5 PG (ref 25.2–33.5)
MCHC RBC AUTO-ENTMCNC: 32.4 G/DL (ref 25.2–33.5)
MCV RBC AUTO: 87.8 FL (ref 82.6–102.9)
MONOCYTES # BLD: 5 % (ref 3–12)
NRBC AUTOMATED: 0 PER 100 WBC
PDW BLD-RTO: 12.2 % (ref 11.8–14.4)
PLATELET # BLD AUTO: 242 K/UL (ref 138–453)
PMV BLD AUTO: 11.1 FL (ref 8.1–13.5)
POTASSIUM SERPL-SCNC: 3.8 MMOL/L (ref 3.7–5.3)
RBC # BLD: 4.67 M/UL (ref 3.95–5.11)
SEG NEUTROPHILS: 62 % (ref 36–65)
SEGMENTED NEUTROPHILS ABSOLUTE COUNT: 4.12 K/UL (ref 1.5–8.1)
SODIUM SERPL-SCNC: 142 MMOL/L (ref 135–144)
WBC # BLD AUTO: 6.7 K/UL (ref 3.5–11.3)

## 2023-04-02 PROCEDURE — 85652 RBC SED RATE AUTOMATED: CPT

## 2023-04-02 PROCEDURE — 6360000004 HC RX CONTRAST MEDICATION: Performed by: EMERGENCY MEDICINE

## 2023-04-02 PROCEDURE — 99285 EMERGENCY DEPT VISIT HI MDM: CPT

## 2023-04-02 PROCEDURE — 6360000002 HC RX W HCPCS: Performed by: EMERGENCY MEDICINE

## 2023-04-02 PROCEDURE — 96375 TX/PRO/DX INJ NEW DRUG ADDON: CPT

## 2023-04-02 PROCEDURE — 85025 COMPLETE CBC W/AUTO DIFF WBC: CPT

## 2023-04-02 PROCEDURE — 96374 THER/PROPH/DIAG INJ IV PUSH: CPT

## 2023-04-02 PROCEDURE — 70450 CT HEAD/BRAIN W/O DYE: CPT

## 2023-04-02 PROCEDURE — 36415 COLL VENOUS BLD VENIPUNCTURE: CPT

## 2023-04-02 PROCEDURE — 2709999900 CTA HEAD NECK W CONTRAST

## 2023-04-02 PROCEDURE — 84703 CHORIONIC GONADOTROPIN ASSAY: CPT

## 2023-04-02 PROCEDURE — 2580000003 HC RX 258: Performed by: EMERGENCY MEDICINE

## 2023-04-02 PROCEDURE — 80048 BASIC METABOLIC PNL TOTAL CA: CPT

## 2023-04-02 RX ORDER — PANTOPRAZOLE SODIUM 40 MG/1
40 GRANULE, DELAYED RELEASE ORAL
COMMUNITY

## 2023-04-02 RX ORDER — DIPHENHYDRAMINE HYDROCHLORIDE 50 MG/ML
25 INJECTION INTRAMUSCULAR; INTRAVENOUS ONCE
Status: COMPLETED | OUTPATIENT
Start: 2023-04-02 | End: 2023-04-02

## 2023-04-02 RX ORDER — PROCHLORPERAZINE EDISYLATE 5 MG/ML
10 INJECTION INTRAMUSCULAR; INTRAVENOUS ONCE
Status: COMPLETED | OUTPATIENT
Start: 2023-04-02 | End: 2023-04-02

## 2023-04-02 RX ORDER — AMITRIPTYLINE HYDROCHLORIDE 10 MG/1
10 TABLET, FILM COATED ORAL NIGHTLY
COMMUNITY

## 2023-04-02 RX ORDER — 0.9 % SODIUM CHLORIDE 0.9 %
1000 INTRAVENOUS SOLUTION INTRAVENOUS ONCE
Status: COMPLETED | OUTPATIENT
Start: 2023-04-02 | End: 2023-04-02

## 2023-04-02 RX ADMIN — IOPAMIDOL 80 ML: 755 INJECTION, SOLUTION INTRAVENOUS at 13:34

## 2023-04-02 RX ADMIN — SODIUM CHLORIDE 1000 ML: 9 INJECTION, SOLUTION INTRAVENOUS at 13:08

## 2023-04-02 RX ADMIN — DIPHENHYDRAMINE HYDROCHLORIDE 25 MG: 50 INJECTION, SOLUTION INTRAMUSCULAR; INTRAVENOUS at 13:07

## 2023-04-02 RX ADMIN — PROCHLORPERAZINE EDISYLATE 10 MG: 5 INJECTION INTRAMUSCULAR; INTRAVENOUS at 13:07

## 2023-04-02 ASSESSMENT — PAIN DESCRIPTION - DESCRIPTORS: DESCRIPTORS: ACHING

## 2023-04-02 ASSESSMENT — PAIN SCALES - GENERAL: PAINLEVEL_OUTOF10: 8

## 2023-04-02 ASSESSMENT — PAIN DESCRIPTION - LOCATION: LOCATION: HEAD

## 2023-04-02 ASSESSMENT — PAIN - FUNCTIONAL ASSESSMENT: PAIN_FUNCTIONAL_ASSESSMENT: 0-10

## 2023-04-02 NOTE — DISCHARGE INSTRUCTIONS
You may take Fioricet for headache as needed. Ibuprofen 400 mg every 4-6 hours for residual headache as needed. Take with food. Return for worsening symptoms.

## 2023-04-02 NOTE — ED PROVIDER NOTES
tablet by mouth nightlyHistorical Med      pantoprazole sodium (PROTONIX) 40 MG PACK packet Take 1 packet by mouth every morning (before breakfast)Historical Med      azithromycin (ZITHROMAX) 250 MG tablet 500 mg on day 1 followed by 250 mg on days 2 - 5, Disp-6 tablet, R-0Normal      butalbital-acetaminophen-caffeine (FIORICET, ESGIC) -40 MG per tablet Take 1 tablet by mouth every 6 hours as needed for HeadachesHistorical Med      albuterol sulfate HFA (VENTOLIN HFA) 108 (90 Base) MCG/ACT inhaler Inhale 2 puffs into the lungs every 6 hours as needed for Wheezing or Shortness of BreathHistorical Med      fluticasone (FLONASE) 50 MCG/ACT nasal spray 1 spray by Each Nostril route dailyHistorical Med             ALLERGIES     Sulfa antibiotics and Pcn [penicillins]    FAMILY HISTORY       Family History   Problem Relation Age of Onset    Heart Disease Maternal Grandfather     Cancer Paternal Grandfather     Heart Disease Mother         mitral valve prolapse    High Cholesterol Father     Asthma Sister     Diabetes Paternal Grandmother           SOCIAL HISTORY       Social History     Socioeconomic History    Marital status:      Spouse name: None    Number of children: None    Years of education: None    Highest education level: None   Tobacco Use    Smoking status: Never    Smokeless tobacco: Never   Substance and Sexual Activity    Alcohol use: Yes     Comment: occasionaly    Drug use: No    Sexual activity: Not Currently     Partners: Male       SCREENINGS    Luh Coma Scale  Eye Opening: Spontaneous  Best Verbal Response: Oriented  Best Motor Response: Obeys commands  Kirkman Coma Scale Score: 15        PHYSICAL EXAM    (up to 7 for level 4, 8 or more for level 5)     ED Triage Vitals [04/02/23 1208]   BP Temp Temp Source Heart Rate Resp SpO2 Height Weight   (!) 126/50 98 °F (36.7 °C) Tympanic 83 16 98 % 5' 9\" (1.753 m) 240 lb (108.9 kg)       Physical Exam  Vitals reviewed.    Constitutional:

## 2023-04-03 RX ORDER — AZITHROMYCIN 250 MG/1
TABLET, FILM COATED ORAL
Qty: 6 TABLET | Refills: 0 | OUTPATIENT
Start: 2023-04-03

## 2023-04-03 NOTE — TELEPHONE ENCOUNTER
Received a refill request for a Zpak. This was last ordered 11/16/21. Spoke with pt- she did not request this med. She is not having any issues. Med refused as \"other. Sent in error. \"